# Patient Record
Sex: FEMALE | Race: WHITE
[De-identification: names, ages, dates, MRNs, and addresses within clinical notes are randomized per-mention and may not be internally consistent; named-entity substitution may affect disease eponyms.]

---

## 2019-01-26 ENCOUNTER — HOSPITAL ENCOUNTER (EMERGENCY)
Dept: HOSPITAL 92 - ERS | Age: 59
Discharge: HOME | End: 2019-01-26
Payer: SELF-PAY

## 2019-01-26 DIAGNOSIS — Z79.82: ICD-10-CM

## 2019-01-26 DIAGNOSIS — K20.9: Primary | ICD-10-CM

## 2019-01-26 DIAGNOSIS — I10: ICD-10-CM

## 2019-01-26 DIAGNOSIS — F41.9: ICD-10-CM

## 2019-01-26 DIAGNOSIS — Z79.899: ICD-10-CM

## 2019-01-26 DIAGNOSIS — E78.5: ICD-10-CM

## 2019-01-26 LAB
ALBUMIN SERPL BCG-MCNC: 4.5 G/DL (ref 3.5–5)
ALP SERPL-CCNC: 84 U/L (ref 40–150)
ALT SERPL W P-5'-P-CCNC: 19 U/L (ref 8–55)
ANION GAP SERPL CALC-SCNC: 13 MMOL/L (ref 10–20)
AST SERPL-CCNC: 17 U/L (ref 5–34)
BASOPHILS # BLD AUTO: 0.1 THOU/UL (ref 0–0.2)
BASOPHILS NFR BLD AUTO: 1 % (ref 0–1)
BILIRUB SERPL-MCNC: 0.4 MG/DL (ref 0.2–1.2)
BUN SERPL-MCNC: 25 MG/DL (ref 9.8–20.1)
CALCIUM SERPL-MCNC: 9.9 MG/DL (ref 7.8–10.44)
CHLORIDE SERPL-SCNC: 101 MMOL/L (ref 98–107)
CO2 SERPL-SCNC: 29 MMOL/L (ref 22–29)
CREAT CL PREDICTED SERPL C-G-VRATE: 0 ML/MIN (ref 70–130)
CRYSTAL-AUWI FLAG: 10 (ref 0–15)
EOSINOPHIL # BLD AUTO: 0.1 THOU/UL (ref 0–0.7)
EOSINOPHIL NFR BLD AUTO: 1.7 % (ref 0–10)
GLOBULIN SER CALC-MCNC: 3.1 G/DL (ref 2.4–3.5)
GLUCOSE SERPL-MCNC: 109 MG/DL (ref 70–105)
HEV IGM SER QL: 2.4 (ref 0–7.99)
HGB BLD-MCNC: 13.1 G/DL (ref 12–16)
HYALINE CASTS #/AREA URNS LPF: (no result) LPF
LYMPHOCYTES # BLD: 2.6 THOU/UL (ref 1.2–3.4)
LYMPHOCYTES NFR BLD AUTO: 34.5 % (ref 21–51)
MCH RBC QN AUTO: 31.2 PG (ref 27–31)
MCV RBC AUTO: 94.3 FL (ref 78–98)
MONOCYTES # BLD AUTO: 0.6 THOU/UL (ref 0.11–0.59)
MONOCYTES NFR BLD AUTO: 7.2 % (ref 0–10)
NEUTROPHILS # BLD AUTO: 4.3 THOU/UL (ref 1.4–6.5)
NEUTROPHILS NFR BLD AUTO: 55.6 % (ref 42–75)
PATHC CAST-AUWI FLAG: 0 (ref 0–2.49)
PLATELET # BLD AUTO: 248 THOU/UL (ref 130–400)
POTASSIUM SERPL-SCNC: 3.7 MMOL/L (ref 3.5–5.1)
RBC # BLD AUTO: 4.2 MILL/UL (ref 4.2–5.4)
RBC UR QL AUTO: (no result) HPF (ref 0–3)
SODIUM SERPL-SCNC: 139 MMOL/L (ref 136–145)
SP GR UR STRIP: 1.02 (ref 1–1.04)
SPERM-AUWI FLAG: 0 (ref 0–9.9)
WBC # BLD AUTO: 7.7 THOU/UL (ref 4.8–10.8)
WBC UR QL AUTO: (no result) HPF (ref 0–3)
YEAST-AUWI FLAG: 0 (ref 0–25)

## 2019-01-26 PROCEDURE — C9113 INJ PANTOPRAZOLE SODIUM, VIA: HCPCS

## 2019-01-26 PROCEDURE — 96361 HYDRATE IV INFUSION ADD-ON: CPT

## 2019-01-26 PROCEDURE — 74018 RADEX ABDOMEN 1 VIEW: CPT

## 2019-01-26 PROCEDURE — 81015 MICROSCOPIC EXAM OF URINE: CPT

## 2019-01-26 PROCEDURE — 87086 URINE CULTURE/COLONY COUNT: CPT

## 2019-01-26 PROCEDURE — 85025 COMPLETE CBC W/AUTO DIFF WBC: CPT

## 2019-01-26 PROCEDURE — 80053 COMPREHEN METABOLIC PANEL: CPT

## 2019-01-26 PROCEDURE — 83880 ASSAY OF NATRIURETIC PEPTIDE: CPT

## 2019-01-26 PROCEDURE — 83690 ASSAY OF LIPASE: CPT

## 2019-01-26 PROCEDURE — 84484 ASSAY OF TROPONIN QUANT: CPT

## 2019-01-26 PROCEDURE — 96374 THER/PROPH/DIAG INJ IV PUSH: CPT

## 2019-01-26 PROCEDURE — 71045 X-RAY EXAM CHEST 1 VIEW: CPT

## 2019-01-26 PROCEDURE — 96375 TX/PRO/DX INJ NEW DRUG ADDON: CPT

## 2019-01-26 PROCEDURE — 81003 URINALYSIS AUTO W/O SCOPE: CPT

## 2019-01-26 PROCEDURE — 93005 ELECTROCARDIOGRAM TRACING: CPT

## 2019-01-26 NOTE — RAD
RADIOGRAPH CHEST 1 VIEW:

 

HISTORY: 

A 58-year-old female with chest pain.

 

FINDINGS:

There are no air space densities, pulmonary edema, pneumothorax, or cardiomegaly.  The lateral costop
hrenic angles are sharp.

 

IMPRESSION:  

No acute cardiopulmonary findings.

 

 

jn []

 

POS: [facility]

## 2019-01-26 NOTE — RAD
RADIOGRAPH ABDOMEN 1 VIEW:

 

DATE: 1/26/2019.

TIME: 1:49 a.m.

 

HISTORY: 

A 58-year-old female with generalized abdominal pain.

 

FINDINGS: 

Supine KUB.  Nonobstructive bowel gas pattern.  No evidence of organomegaly.  Degenerative changes at
 lower lumbar spine.

 

IMPRESSION: 

1.  Lower lumbar spondylosis.

 

2.  No evidence of bowel obstruction.

 

POS: [facility]